# Patient Record
Sex: MALE | Race: WHITE | NOT HISPANIC OR LATINO | Employment: FULL TIME | URBAN - NONMETROPOLITAN AREA
[De-identification: names, ages, dates, MRNs, and addresses within clinical notes are randomized per-mention and may not be internally consistent; named-entity substitution may affect disease eponyms.]

---

## 2023-04-13 ENCOUNTER — HOSPITAL ENCOUNTER (OUTPATIENT)
Dept: DATA CONVERSION | Facility: HOSPITAL | Age: 58
End: 2023-04-13
Attending: SURGERY | Admitting: SURGERY
Payer: COMMERCIAL

## 2023-04-13 DIAGNOSIS — E11.9 TYPE 2 DIABETES MELLITUS WITHOUT COMPLICATIONS (MULTI): ICD-10-CM

## 2023-04-13 DIAGNOSIS — K63.5 POLYP OF COLON: ICD-10-CM

## 2023-04-13 DIAGNOSIS — Z79.84 LONG TERM (CURRENT) USE OF ORAL HYPOGLYCEMIC DRUGS: ICD-10-CM

## 2023-04-13 DIAGNOSIS — K62.1 RECTAL POLYP: ICD-10-CM

## 2023-04-13 DIAGNOSIS — Z12.11 ENCOUNTER FOR SCREENING FOR MALIGNANT NEOPLASM OF COLON: ICD-10-CM

## 2023-04-13 DIAGNOSIS — Z87.891 PERSONAL HISTORY OF NICOTINE DEPENDENCE: ICD-10-CM

## 2023-04-13 DIAGNOSIS — I10 ESSENTIAL (PRIMARY) HYPERTENSION: ICD-10-CM

## 2023-04-13 DIAGNOSIS — G25.81 RESTLESS LEGS SYNDROME: ICD-10-CM

## 2023-04-13 DIAGNOSIS — Z86.010 PERSONAL HISTORY OF COLONIC POLYPS: ICD-10-CM

## 2023-04-13 DIAGNOSIS — K57.30 DIVERTICULOSIS OF LARGE INTESTINE WITHOUT PERFORATION OR ABSCESS WITHOUT BLEEDING: ICD-10-CM

## 2023-04-13 DIAGNOSIS — E78.5 HYPERLIPIDEMIA, UNSPECIFIED: ICD-10-CM

## 2023-04-13 LAB — POCT GLUCOSE: 112 MG/DL (ref 74–99)

## 2023-04-28 LAB
COMPLETE PATHOLOGY REPORT: NORMAL
CONVERTED CLINICAL DIAGNOSIS-HISTORY: NORMAL
CONVERTED FINAL DIAGNOSIS: NORMAL
CONVERTED FINAL REPORT PDF LINK TO COPY AND PASTE: NORMAL
CONVERTED GROSS DESCRIPTION: NORMAL

## 2023-05-24 DIAGNOSIS — N18.1 TYPE 2 DIABETES MELLITUS WITH STAGE 1 CHRONIC KIDNEY DISEASE, WITHOUT LONG-TERM CURRENT USE OF INSULIN (MULTI): ICD-10-CM

## 2023-05-24 DIAGNOSIS — I10 PRIMARY HYPERTENSION: ICD-10-CM

## 2023-05-24 DIAGNOSIS — E11.22 TYPE 2 DIABETES MELLITUS WITH STAGE 1 CHRONIC KIDNEY DISEASE, WITHOUT LONG-TERM CURRENT USE OF INSULIN (MULTI): ICD-10-CM

## 2023-05-24 DIAGNOSIS — G25.81 RLS (RESTLESS LEGS SYNDROME): ICD-10-CM

## 2023-05-24 RX ORDER — CLOTRIMAZOLE AND BETAMETHASONE DIPROPIONATE 10; .64 MG/G; MG/G
1 CREAM TOPICAL 2 TIMES DAILY
COMMUNITY
Start: 2020-09-10

## 2023-05-24 RX ORDER — ROPINIROLE 1 MG/1
1 TABLET, FILM COATED ORAL 3 TIMES DAILY
Qty: 270 TABLET | Refills: 0 | Status: SHIPPED | OUTPATIENT
Start: 2023-05-24 | End: 2023-10-27 | Stop reason: SDUPTHER

## 2023-05-24 RX ORDER — MULTIVIT-MIN/FA/LYCOPEN/LUTEIN .4-300-25
1 TABLET ORAL DAILY
COMMUNITY
Start: 2020-08-26

## 2023-05-24 RX ORDER — SITAGLIPTIN 100 MG/1
100 TABLET, FILM COATED ORAL DAILY
COMMUNITY
End: 2023-05-24 | Stop reason: SDUPTHER

## 2023-05-24 RX ORDER — ROPINIROLE 1 MG/1
1 TABLET, FILM COATED ORAL 3 TIMES DAILY
COMMUNITY
End: 2023-05-24 | Stop reason: SDUPTHER

## 2023-05-24 RX ORDER — LISINOPRIL 5 MG/1
5 TABLET ORAL DAILY
COMMUNITY
End: 2023-05-24 | Stop reason: SDUPTHER

## 2023-05-24 RX ORDER — LISINOPRIL 5 MG/1
5 TABLET ORAL DAILY
Qty: 90 TABLET | Refills: 0 | Status: SHIPPED | OUTPATIENT
Start: 2023-05-24 | End: 2023-10-27 | Stop reason: SDUPTHER

## 2023-05-24 RX ORDER — METFORMIN HYDROCHLORIDE 500 MG/1
500 TABLET ORAL
Qty: 90 TABLET | Refills: 0 | Status: SHIPPED | OUTPATIENT
Start: 2023-05-24 | End: 2023-10-27 | Stop reason: SDUPTHER

## 2023-05-24 RX ORDER — CALCIUM CARBONATE 500(1250)
1 TABLET,CHEWABLE ORAL
COMMUNITY
Start: 2020-08-26 | End: 2023-10-27 | Stop reason: SDUPTHER

## 2023-05-24 RX ORDER — VIT C/E/ZN/COPPR/LUTEIN/ZEAXAN 250MG-90MG
25 CAPSULE ORAL DAILY
COMMUNITY
Start: 2020-08-26 | End: 2023-10-27 | Stop reason: SDUPTHER

## 2023-05-24 RX ORDER — METFORMIN HYDROCHLORIDE 500 MG/1
500 TABLET ORAL
COMMUNITY
End: 2023-05-24 | Stop reason: SDUPTHER

## 2023-09-07 VITALS
TEMPERATURE: 98.1 F | RESPIRATION RATE: 17 BRPM | HEART RATE: 64 BPM | DIASTOLIC BLOOD PRESSURE: 89 MMHG | BODY MASS INDEX: 27.59 KG/M2 | HEIGHT: 74 IN | SYSTOLIC BLOOD PRESSURE: 140 MMHG | WEIGHT: 214.95 LBS

## 2023-09-14 NOTE — H&P
History of Present Illness:   History Present Illness:  Reason for surgery: Low risk colon cancer   HPI:    Here for colonoscopy     Past Medical History/Past Surgical History - see Patient Info Tab/Significant Events     Allergies:        Allergies:  ·  No Known Allergies :     Home Medication Review:   Home Medications Reviewed: yes     Impression/Procedure:   ·  Impression and Planned Procedure: Low risk colon cancer   Colonoscopy       ERAS (Enhanced Recovery After Surgery):  ·  ERAS Patient: no     Review of Systems:   Review of Systems:  Constitutional: NEGATIVE: Fever, Chills, Anorexia,  Weight Loss, Malaise     All Other Systems: All other systems reviewed and  are negative       Vital Signs:  Temperature C: 36.7 degrees C   Temperature F: 98 degrees F   Heart Rate: 64 beats per minute   Respiratory Rate: 17 breath per minute   Blood Pressure Systolic: 140 mm/Hg   Blood Pressure Diastolic: 89 mm/Hg     Physical Exam by System:    Constitutional: Well developed , awake, alert   Eyes: Clear sclera   ENMT: mucous membranes moist, no apparent injury,  no lesions seen   Head/Neck: Neck supple, no masses   Respiratory/Thorax: Lungs clear to auscultation   Cardiovascular: Regular, rate and rhythm, no murmurs,   Gastrointestinal: Nondistended, soft, non-tender,  no masses   Musculoskeletal: ROM intact, no joint swelling, normal  strength   Extremities: normal extremities,   Neurological: Intact   Lymphatic: No significant lymphadenopathy   Psychological: Appropriate mood and behavior   Skin: Warm and dry, no lesions, no rashes     Consent:   COVID-19 Consent:  ·  COVID-19 Risk Consent Surgeon has reviewed key risks related to the risk of [place]ghan COVID-19 and if they contract COVID-19 what the risks are.       Electronic Signatures:  Martinez Mccarty)  (Signed 13-Apr-2023 07:11)   Authored: History of Present Illness, Allergies, Home  Medication Review, Impression/Procedure, ERAS, Review of Systems, Physical  Exam, Consent, Note Completion      Last Updated: 13-Apr-2023 07:11 by Martinez Mccarty)

## 2023-09-27 PROBLEM — F17.210 CIGARETTE NICOTINE DEPENDENCE WITHOUT COMPLICATION: Status: ACTIVE | Noted: 2023-09-27

## 2023-09-27 PROBLEM — I10 HTN (HYPERTENSION): Status: ACTIVE | Noted: 2023-09-27

## 2023-09-27 PROBLEM — R41.3 MEMORY PROBLEM: Status: ACTIVE | Noted: 2023-09-27

## 2023-09-27 PROBLEM — R53.83 FATIGUE: Status: ACTIVE | Noted: 2023-09-27

## 2023-09-27 PROBLEM — E78.5 DYSLIPIDEMIA: Status: ACTIVE | Noted: 2023-09-27

## 2023-09-27 PROBLEM — M53.9 MULTILEVEL DEGENERATIVE DISC DISEASE: Status: ACTIVE | Noted: 2023-09-27

## 2023-09-27 PROBLEM — R74.8 ABNORMAL LIVER ENZYMES: Status: ACTIVE | Noted: 2023-09-27

## 2023-09-27 PROBLEM — E66.3 OVER WEIGHT: Status: ACTIVE | Noted: 2023-09-27

## 2023-09-27 PROBLEM — E11.9 DM2 (DIABETES MELLITUS, TYPE 2) (MULTI): Status: ACTIVE | Noted: 2023-09-27

## 2023-09-27 PROBLEM — G25.81 RLS (RESTLESS LEGS SYNDROME): Status: ACTIVE | Noted: 2023-09-27

## 2023-09-27 PROBLEM — M72.0 DUPUYTREN'S CONTRACTURE OF LEFT HAND: Status: ACTIVE | Noted: 2023-09-27

## 2023-09-27 PROBLEM — E55.9 VITAMIN D DEFICIENCY: Status: ACTIVE | Noted: 2023-09-27

## 2023-09-27 RX ORDER — FLAXSEED OIL 1000 MG
CAPSULE ORAL DAILY
COMMUNITY
Start: 2020-08-26

## 2023-09-27 RX ORDER — GLUCOSAMINE/CHONDRO SU A 500-400 MG
TABLET ORAL DAILY
COMMUNITY
Start: 2020-08-26

## 2023-09-27 RX ORDER — PLANT STANOL ESTER 450 MG
TABLET ORAL
COMMUNITY
Start: 2020-08-26

## 2023-10-02 ENCOUNTER — OFFICE VISIT (OUTPATIENT)
Dept: PULMONOLOGY | Facility: CLINIC | Age: 58
End: 2023-10-02
Payer: COMMERCIAL

## 2023-10-02 VITALS
HEART RATE: 54 BPM | WEIGHT: 210 LBS | SYSTOLIC BLOOD PRESSURE: 174 MMHG | DIASTOLIC BLOOD PRESSURE: 81 MMHG | BODY MASS INDEX: 26.98 KG/M2 | OXYGEN SATURATION: 98 %

## 2023-10-02 DIAGNOSIS — F17.200 TOBACCO DEPENDENCE: Primary | ICD-10-CM

## 2023-10-02 DIAGNOSIS — G47.30 SLEEP DISORDER BREATHING: ICD-10-CM

## 2023-10-02 DIAGNOSIS — R06.02 SHORT OF BREATH ON EXERTION: ICD-10-CM

## 2023-10-02 PROCEDURE — 3044F HG A1C LEVEL LT 7.0%: CPT | Performed by: PEDIATRICS

## 2023-10-02 PROCEDURE — 4010F ACE/ARB THERAPY RXD/TAKEN: CPT | Performed by: PEDIATRICS

## 2023-10-02 PROCEDURE — 3077F SYST BP >= 140 MM HG: CPT | Performed by: PEDIATRICS

## 2023-10-02 PROCEDURE — 99205 OFFICE O/P NEW HI 60 MIN: CPT | Performed by: PEDIATRICS

## 2023-10-02 PROCEDURE — 3079F DIAST BP 80-89 MM HG: CPT | Performed by: PEDIATRICS

## 2023-10-02 ASSESSMENT — ENCOUNTER SYMPTOMS
FATIGUE: 1
SHORTNESS OF BREATH: 1
EYES NEGATIVE: 1
COUGH: 1
ARTHRALGIAS: 1

## 2023-10-02 NOTE — PROGRESS NOTES
Subjective   Patient ID: Randell Collier is a 58 y.o. male who presents for Cough.  Mr Collier is a 58yr old that is here for evaluation of poor sleep, intermittent cough and shortness of breath with exertion.  The cough is intermittent and occasionally productive, it is not terribly bothersome for him.  He does get short of breath with exertion.  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  He also has poor sleep and feels fatigued during the day.  He snores loudly as well.  He has been told he has sleep apnea in the past but never did anything about it.    He is a former smoker 1/2 - 1 ppd from age [age]        Review of Systems   Constitutional:  Positive for fatigue.   HENT: Negative.     Eyes: Negative.    Respiratory:  Positive for cough and shortness of breath.    Musculoskeletal:  Positive for arthralgias.       Objective   Physical Exam  Constitutional:       Appearance: Normal appearance.   HENT:      Head: Normocephalic and atraumatic.      Mouth/Throat:      Pharynx: Oropharynx is clear.   Cardiovascular:      Rate and Rhythm: Normal rate and regular rhythm.      Pulses: Normal pulses.      Heart sounds: Normal heart sounds.   Pulmonary:      Effort: Pulmonary effort is normal.      Breath sounds: Normal breath sounds. No wheezing, rhonchi or rales.   Abdominal:      General: Bowel sounds are normal.      Palpations: Abdomen is soft.   Musculoskeletal:         General: Normal range of motion.   Skin:     General: Skin is warm and dry.   Neurological:      General: No focal deficit present.      Mental Status: He is alert and oriented to person, place, and time.   Psychiatric:         Mood and Affect: Mood normal.         Assessment/Plan     58yr old with likely sleep disordered breathing, intermittent cough and JONES.    ?SDB:  - will get home sleep study  - follow up with sleep if needed    2. JONES:  ?COPD  - will get PFT and 6MWT    3. Tobacco use:  quit last  year  - will enroll in low dose [place] screening program.    Follow up after tests.

## 2023-10-17 ENCOUNTER — HOSPITAL ENCOUNTER (OUTPATIENT)
Dept: RADIOLOGY | Facility: HOSPITAL | Age: 58
Discharge: HOME | End: 2023-10-17
Payer: COMMERCIAL

## 2023-10-17 DIAGNOSIS — F17.200 TOBACCO DEPENDENCE: ICD-10-CM

## 2023-10-17 PROCEDURE — 71271 CT THORAX LUNG CANCER SCR C-: CPT | Performed by: RADIOLOGY

## 2023-10-17 PROCEDURE — 71271 CT THORAX LUNG CANCER SCR C-: CPT

## 2023-10-19 ENCOUNTER — HOSPITAL ENCOUNTER (OUTPATIENT)
Dept: RESPIRATORY THERAPY | Facility: HOSPITAL | Age: 58
Discharge: HOME | End: 2023-10-19
Payer: COMMERCIAL

## 2023-10-19 DIAGNOSIS — R06.02 SHORT OF BREATH ON EXERTION: ICD-10-CM

## 2023-10-19 DIAGNOSIS — R06.02 SHORTNESS OF BREATH: Primary | ICD-10-CM

## 2023-10-19 PROCEDURE — 94618 PULMONARY STRESS TESTING: CPT | Performed by: PEDIATRICS

## 2023-10-19 PROCEDURE — 94729 DIFFUSING CAPACITY: CPT | Performed by: PEDIATRICS

## 2023-10-19 PROCEDURE — 94060 EVALUATION OF WHEEZING: CPT | Performed by: PEDIATRICS

## 2023-10-19 PROCEDURE — 94726 PLETHYSMOGRAPHY LUNG VOLUMES: CPT | Performed by: PEDIATRICS

## 2023-10-27 ENCOUNTER — OFFICE VISIT (OUTPATIENT)
Dept: PRIMARY CARE | Facility: CLINIC | Age: 58
End: 2023-10-27
Payer: COMMERCIAL

## 2023-10-27 VITALS
SYSTOLIC BLOOD PRESSURE: 130 MMHG | WEIGHT: 231 LBS | HEIGHT: 74 IN | TEMPERATURE: 98 F | HEART RATE: 142 BPM | BODY MASS INDEX: 29.65 KG/M2 | OXYGEN SATURATION: 96 % | DIASTOLIC BLOOD PRESSURE: 80 MMHG

## 2023-10-27 DIAGNOSIS — E55.9 VITAMIN D DEFICIENCY: Primary | ICD-10-CM

## 2023-10-27 DIAGNOSIS — E11.9 TYPE 2 DIABETES MELLITUS WITHOUT COMPLICATION, WITHOUT LONG-TERM CURRENT USE OF INSULIN (MULTI): ICD-10-CM

## 2023-10-27 DIAGNOSIS — G25.81 RLS (RESTLESS LEGS SYNDROME): ICD-10-CM

## 2023-10-27 DIAGNOSIS — N18.1 TYPE 2 DIABETES MELLITUS WITH STAGE 1 CHRONIC KIDNEY DISEASE, WITHOUT LONG-TERM CURRENT USE OF INSULIN (MULTI): ICD-10-CM

## 2023-10-27 DIAGNOSIS — E11.22 TYPE 2 DIABETES MELLITUS WITH STAGE 1 CHRONIC KIDNEY DISEASE, WITHOUT LONG-TERM CURRENT USE OF INSULIN (MULTI): ICD-10-CM

## 2023-10-27 DIAGNOSIS — I10 PRIMARY HYPERTENSION: ICD-10-CM

## 2023-10-27 LAB — POC HEMOGLOBIN A1C: 5.2 % (ref 4.2–6.5)

## 2023-10-27 PROCEDURE — 4010F ACE/ARB THERAPY RXD/TAKEN: CPT

## 2023-10-27 PROCEDURE — 83036 HEMOGLOBIN GLYCOSYLATED A1C: CPT

## 2023-10-27 PROCEDURE — 99214 OFFICE O/P EST MOD 30 MIN: CPT

## 2023-10-27 PROCEDURE — 3079F DIAST BP 80-89 MM HG: CPT

## 2023-10-27 PROCEDURE — 3075F SYST BP GE 130 - 139MM HG: CPT

## 2023-10-27 PROCEDURE — 3044F HG A1C LEVEL LT 7.0%: CPT

## 2023-10-27 RX ORDER — VIT C/E/ZN/COPPR/LUTEIN/ZEAXAN 250MG-90MG
25 CAPSULE ORAL DAILY
Qty: 90 CAPSULE | Refills: 3 | Status: SHIPPED | OUTPATIENT
Start: 2023-10-27 | End: 2024-10-26

## 2023-10-27 RX ORDER — METFORMIN HYDROCHLORIDE 500 MG/1
500 TABLET ORAL
Qty: 90 TABLET | Refills: 3 | Status: SHIPPED | OUTPATIENT
Start: 2023-10-27

## 2023-10-27 RX ORDER — LISINOPRIL 5 MG/1
5 TABLET ORAL DAILY
Qty: 90 TABLET | Refills: 3 | Status: SHIPPED | OUTPATIENT
Start: 2023-10-27

## 2023-10-27 RX ORDER — ROPINIROLE 1 MG/1
1 TABLET, FILM COATED ORAL 3 TIMES DAILY
Qty: 270 TABLET | Refills: 3 | Status: SHIPPED | OUTPATIENT
Start: 2023-10-27

## 2023-10-27 RX ORDER — CALCIUM CARBONATE 500(1250)
1 TABLET,CHEWABLE ORAL
Qty: 90 TABLET | Refills: 3 | Status: SHIPPED | OUTPATIENT
Start: 2023-10-27 | End: 2024-10-26

## 2023-10-27 ASSESSMENT — ENCOUNTER SYMPTOMS
CARDIOVASCULAR NEGATIVE: 1
DEPRESSION: 0
LOSS OF SENSATION IN FEET: 0
RESPIRATORY NEGATIVE: 1
HEADACHES: 0
ENDOCRINE NEGATIVE: 1
DIZZINESS: 0
FEVER: 0
UNEXPECTED WEIGHT CHANGE: 0
SHORTNESS OF BREATH: 0
OCCASIONAL FEELINGS OF UNSTEADINESS: 0
FATIGUE: 0
ALLERGIC/IMMUNOLOGIC NEGATIVE: 1
MUSCULOSKELETAL NEGATIVE: 1
ABDOMINAL PAIN: 0
PSYCHIATRIC NEGATIVE: 1
GASTROINTESTINAL NEGATIVE: 1
ACTIVITY CHANGE: 0
WEAKNESS: 0

## 2023-10-27 ASSESSMENT — PATIENT HEALTH QUESTIONNAIRE - PHQ9
2. FEELING DOWN, DEPRESSED OR HOPELESS: NOT AT ALL
SUM OF ALL RESPONSES TO PHQ9 QUESTIONS 1 & 2: 0
1. LITTLE INTEREST OR PLEASURE IN DOING THINGS: NOT AT ALL

## 2023-10-27 NOTE — PROGRESS NOTES
Subjective   Patient ID: Randell Collier is a 58 y.o. male who presents for Establish Care (Randell is here to establish care. ).  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   Happy with his lifestyle, active  Appears younger than stated age  Fatigue Not of concern to him, he works long hours, up at 0530.         Vitals:    10/27/23 1321   BP: 130/80   Pulse: (!) 142   Temp: 36.7 °C (98 °F)   SpO2: 96%       Review of Systems   Constitutional:  Negative for activity change, fatigue, fever and unexpected weight change.   HENT: Negative.     Respiratory: Negative.  Negative for shortness of breath.    Cardiovascular: Negative.  Negative for chest pain.   Gastrointestinal: Negative.  Negative for abdominal pain.   Endocrine: Negative.    Musculoskeletal: Negative.    Skin: Negative.    Allergic/Immunologic: Negative.    Neurological:  Negative for dizziness, weakness and headaches.   Psychiatric/Behavioral: Negative.         Objective   Physical Exam  Vitals and nursing note reviewed.   Constitutional:       Appearance: Normal appearance.   HENT:      Head: Normocephalic.      Mouth/Throat:      Mouth: Mucous membranes are moist.   Cardiovascular:      Rate and Rhythm: Normal rate and regular rhythm.      Pulses: Normal pulses.      Heart sounds: Normal heart sounds. No murmur heard.     No friction rub. No gallop.   Pulmonary:      Effort: Pulmonary effort is normal. No respiratory distress.      Breath sounds: Normal breath sounds. No wheezing.   Abdominal:      General: Bowel sounds are normal. There is no distension.      Palpations: Abdomen is soft.      Tenderness: There is no abdominal tenderness.   Musculoskeletal:         General: No deformity. Normal range of motion.   Skin:     General: Skin is warm and dry.      Capillary Refill: Capillary refill takes less than 2 seconds.   Neurological:      General: No focal deficit present.       Mental Status: He is alert and oriented to person, place, and time.   Psychiatric:         Mood and Affect: Mood normal.         Assessment/Plan   Problem List Items Addressed This Visit       DM2 (diabetes mellitus, type 2) (CMS/[facility])    Relevant Medications    metFORMIN (Glucophage) 500 mg tablet    SITagliptin phosphate (Januvia) 100 mg tablet    Other Relevant Orders    POCT glycosylated hemoglobin (Hb A1C) manually resulted (Completed)    HTN (hypertension)    Relevant Medications    lisinopril 5 mg tablet    RLS (restless legs syndrome)    Relevant Medications    rOPINIRole (Requip) 1 mg tablet    Vitamin D deficiency - Primary    Relevant Medications    calcium carbonate 500 mg calcium (1,250 mg) chewable tablet    cholecalciferol (Vitamin D-3) 25 MCG (1000 UT) capsule            Thank you for coming in today, please call my office if you have any concerns or questions.     Mariusz TORRES, CNP

## 2023-11-20 LAB
MGC ASCENT PFT - FEV1 - POST: 3.76
MGC ASCENT PFT - FEV1 - PRE: 3.77
MGC ASCENT PFT - FEV1 - PREDICTED: 3.9
MGC ASCENT PFT - FVC - POST: 4.62
MGC ASCENT PFT - FVC - PRE: 4.54
MGC ASCENT PFT - FVC - PREDICTED: 5.08

## 2023-12-04 ENCOUNTER — CLINICAL SUPPORT (OUTPATIENT)
Dept: SLEEP MEDICINE | Facility: HOSPITAL | Age: 58
End: 2023-12-04
Payer: COMMERCIAL

## 2023-12-04 DIAGNOSIS — G47.30 SLEEP DISORDER BREATHING: ICD-10-CM

## 2023-12-04 PROCEDURE — 95806 SLEEP STUDY UNATT&RESP EFFT: CPT | Performed by: PSYCHIATRY & NEUROLOGY

## 2024-01-08 ENCOUNTER — APPOINTMENT (OUTPATIENT)
Dept: PULMONOLOGY | Facility: CLINIC | Age: 59
End: 2024-01-08
Payer: COMMERCIAL

## 2024-01-08 NOTE — PROGRESS NOTES
Subjective   Patient ID: Randell Collier is a 58 y.o. male who presents for cough, JONES, MADELEINE    HPI     Randell Collier is a 58 y.o. male who presents for Cough.  Mr Collier is a 58yr old that is here for evaluation of poor sleep, intermittent cough and shortness of breath with exertion.  The cough is intermittent and occasionally productive, it is not terribly bothersome for him.  He does get short of breath with exertion.  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  He also has poor sleep and feels fatigued during the day.  He snores loudly as well.  He has been told he has sleep apnea in the past but never did anything about it.     He is a former smoker 1/2 - 1 ppd from age [age]    Review of Systems    See scanned documents attached to this note for review of systems, and appropriate scales/scores for this visit.     Objective   Physical Exam    Assessment/Plan     58yr old with likely sleep disordered breathing, intermittent cough and JONES.     ?SDB:  - will get home sleep study  - follow up with sleep if needed     2. JONES:  ?COPD  - will get PFT and 6MWT     3. Tobacco use:  quit last year  - will enroll in low dose [place] screening program.     Follow up after tests.       Jersey Hernandez MD 01/08/24 9:06 AM

## 2024-01-15 ENCOUNTER — OFFICE VISIT (OUTPATIENT)
Dept: SLEEP MEDICINE | Facility: CLINIC | Age: 59
End: 2024-01-15
Payer: COMMERCIAL

## 2024-01-15 ENCOUNTER — OFFICE VISIT (OUTPATIENT)
Dept: PULMONOLOGY | Facility: CLINIC | Age: 59
End: 2024-01-15
Payer: COMMERCIAL

## 2024-01-15 VITALS
HEART RATE: 64 BPM | SYSTOLIC BLOOD PRESSURE: 167 MMHG | DIASTOLIC BLOOD PRESSURE: 74 MMHG | BODY MASS INDEX: 30.92 KG/M2 | WEIGHT: 237.6 LBS | OXYGEN SATURATION: 97 %

## 2024-01-15 VITALS
SYSTOLIC BLOOD PRESSURE: 167 MMHG | HEART RATE: 64 BPM | BODY MASS INDEX: 30.92 KG/M2 | OXYGEN SATURATION: 97 % | WEIGHT: 237.6 LBS | DIASTOLIC BLOOD PRESSURE: 74 MMHG

## 2024-01-15 DIAGNOSIS — G47.33 OSA (OBSTRUCTIVE SLEEP APNEA): Primary | ICD-10-CM

## 2024-01-15 DIAGNOSIS — R53.83 FATIGUE, UNSPECIFIED TYPE: ICD-10-CM

## 2024-01-15 DIAGNOSIS — R06.02 SHORTNESS OF BREATH: ICD-10-CM

## 2024-01-15 DIAGNOSIS — F17.210 CIGARETTE NICOTINE DEPENDENCE WITHOUT COMPLICATION: Primary | ICD-10-CM

## 2024-01-15 PROCEDURE — 1036F TOBACCO NON-USER: CPT | Performed by: PEDIATRICS

## 2024-01-15 PROCEDURE — 3078F DIAST BP <80 MM HG: CPT

## 2024-01-15 PROCEDURE — 3078F DIAST BP <80 MM HG: CPT | Performed by: PEDIATRICS

## 2024-01-15 PROCEDURE — 4010F ACE/ARB THERAPY RXD/TAKEN: CPT

## 2024-01-15 PROCEDURE — 3077F SYST BP >= 140 MM HG: CPT

## 2024-01-15 PROCEDURE — 4010F ACE/ARB THERAPY RXD/TAKEN: CPT | Performed by: PEDIATRICS

## 2024-01-15 PROCEDURE — 1036F TOBACCO NON-USER: CPT

## 2024-01-15 PROCEDURE — 99214 OFFICE O/P EST MOD 30 MIN: CPT

## 2024-01-15 PROCEDURE — 3077F SYST BP >= 140 MM HG: CPT | Performed by: PEDIATRICS

## 2024-01-15 PROCEDURE — 99215 OFFICE O/P EST HI 40 MIN: CPT | Performed by: PEDIATRICS

## 2024-01-15 ASSESSMENT — ANXIETY QUESTIONNAIRES
5. BEING SO RESTLESS THAT IT IS HARD TO SIT STILL: NOT AT ALL
3. WORRYING TOO MUCH ABOUT DIFFERENT THINGS: SEVERAL DAYS
7. FEELING AFRAID AS IF SOMETHING AWFUL MIGHT HAPPEN: NOT AT ALL
1. FEELING NERVOUS, ANXIOUS, OR ON EDGE: NOT AT ALL
2. NOT BEING ABLE TO STOP OR CONTROL WORRYING: SEVERAL DAYS
4. TROUBLE RELAXING: NOT AT ALL
6. BECOMING EASILY ANNOYED OR IRRITABLE: SEVERAL DAYS
GAD7 TOTAL SCORE: 3

## 2024-01-15 ASSESSMENT — SLEEP AND FATIGUE QUESTIONNAIRES
SITING INACTIVE IN A PUBLIC PLACE LIKE A CLASS ROOM OR A MOVIE THEATER: SLIGHT CHANCE OF DOZING
HOW LIKELY ARE YOU TO NOD OFF OR FALL ASLEEP WHILE SITTING AND TALKING TO SOMEONE: WOULD NEVER DOZE
SATISFACTION_WITH_CURRENT_SLEEP_PATTERN: SATISFIED
HOW LIKELY ARE YOU TO NOD OFF OR FALL ASLEEP WHEN YOU ARE A PASSENGER IN A CAR FOR AN HOUR WITHOUT A BREAK: WOULD NEVER DOZE
SLEEP_PROBLEM_INTERFERES_DAILY_ACTIVITIES: A LITTLE
DIFFICULTY_FALLING_ASLEEP: MILD
SLEEP_PROBLEM_NOTICEABLE_TO_OTHERS: A LITTLE
HOW LIKELY ARE YOU TO NOD OFF OR FALL ASLEEP WHILE SITTING AND READING: SLIGHT CHANCE OF DOZING
WORRIED_DISTRESSED_DUE_TO_SLEEP: A LITTLE
HOW LIKELY ARE YOU TO NOD OFF OR FALL ASLEEP WHILE SITTING QUIETLY AFTER LUNCH WITHOUT ALCOHOL: WOULD NEVER DOZE
HOW LIKELY ARE YOU TO NOD OFF OR FALL ASLEEP IN A CAR, WHILE STOPPED FOR A FEW MINUTES IN TRAFFIC: WOULD NEVER DOZE
DIFFICULTY_STAYING_ASLEEP: MILD
HOW LIKELY ARE YOU TO NOD OFF OR FALL ASLEEP WHILE WATCHING TV: MODERATE CHANCE OF DOZING
HOW LIKELY ARE YOU TO NOD OFF OR FALL ASLEEP WHILE LYING DOWN TO REST IN THE AFTERNOON WHEN CIRCUMSTANCES PERMIT: MODERATE CHANCE OF DOZING
ESS-CHAD TOTAL SCORE: 6

## 2024-01-15 ASSESSMENT — PATIENT HEALTH QUESTIONNAIRE - PHQ9
SUM OF ALL RESPONSES TO PHQ9 QUESTIONS 1 AND 2: 0
2. FEELING DOWN, DEPRESSED OR HOPELESS: NOT AT ALL
1. LITTLE INTEREST OR PLEASURE IN DOING THINGS: NOT AT ALL

## 2024-01-15 NOTE — LETTER
Sleep Apnea Information    What is sleep apnea? --- Sleep apnea is a condition that makes you stop breathing for short periods while you are asleep. There are 2 types of sleep apnea. One is called “obstructive sleep apnea” and the other is called “central sleep apnea”.     In obstructive sleep apnea, you stop breathing because your throat narrows or closes.    In central sleep apnea, you stop breathing because your brain does not send the right signals to your muscles to make you breathe.  When people talk about sleep apnea, they are usually referring to obstructive sleep apnea. People with sleep apnea do not know that they stop breathing when they are asleep. But they do sometimes wake up startled or gasping for breath. They also often hear from loved ones that they snore.    What are the symptoms of sleep apnea? --- The main symptoms of sleep apnea are loud snoring, tiredness, and daytime sleepiness. Other symptoms can include:  Restless sleep  Waking up choking or gasping in the middle of your sleep  Morning headaches, dry mouth, or sore throat  Waking up often to urinate  Waking up feeling unrested or groggy  Trouble thinking clearly or remembering things  Some people with sleep apnea don't have symptoms or they don't know they have them. They might figure that it's normal to be tired or to snore a lot.    Is there a test for sleep apnea? --- Yes. If your provider suspects you have sleep apneas, he or she might send you for a “sleep study”. Sleep studies can sometimes be done at home, but they are usually done in a sleep lab. For the sleep study done in a sleep lab, you have to spend the night and sleep in the lab while being hooked up to different machines that monitor your heart rate, breathing, and other body functions. The results of the test will tell your provider if you have the disorder.    Is there anything I can do on my own to help my sleep apnea? --- Yes. Here are some thing that might help:  Stay  off your back when sleeping (this is not always practical, because people cannot control their position while asleep. Plus, it only helped some people)  Lose weight, if you are overweight.  Avoid alcohol and sedative-hypnotic drugs because they can make sleep apnea wore.    How is sleep apnea treated? --- As mentioned above weight loss can help if you are overweight or obese. But losing weight can be challenging, and it takes time to lose enough weight to help with your sleep apnea. Most people need other treatment while they work on losing weight.    The most effective treatment for sleep apnea is a device that keeps your airway open while you sleep. Treatment with this device is called “continuous positive airway pressure,” or CPAP. People getting CPAP wear a face mask at night that keeps them breathing.  If you doctor or nurse recommends a CPAP machine, try to be patient about using it. The mask might seem uncomfortable to wear at first, and the machine might seem noisy, but using the machine can really pay off. People with sleep apnea who use a CPAP machine feel more rested and generally feel better.    There is also another device that you wear in your mouth called an “oral appliance” or mandibular advancement device.” It also helps keep your airway open while you sleep.    In rare cases, when nothing else help, doctors recommend surgery to keep the airway open. Surgery to do this is not always effective, and even when it is, the problem can come back. In other people, CPAP is still needed after surgery.    Lastly, in certain circumstances, doctors may recommend the hypoglossal nerve stimulator also known as Inspire. Inspire is a device that is being implanted under the skin below your right collage bone. It has its remote control that you can use to activate it at bedtime. It basically moves your tongue forward while you are sleeping to open up your airway however, not everybody is a good candidate for  Inspire; hence, you will need a comprehensive evaluation with testing such as sleep study and IDISE (drug-induced sleep endoscopy) from a sleep specialist before getting Inspire implanted.    Is sleep apnea dangerous? --- It can be. People with sleep apnea do not get good-quality sleep, so they are often tired and not alert. This puts them at risk for car accidents and other types of accidents. Plus, studies show that people with sleep apnea are more likely than others to have high blood pressure, heart attacks and other serious heart problems. In people with sever sleep apnea, getting treated (for example with a CPAP machine) can help prevent some of these problems.      HEALTHY SLEEP TIPS FOR PATIENTS WITH MADELEINE    Weight Reduction  An increase in your weight will make your snoring and sleep-related breathing irregularities worse, and reducing your weight is likely to improve your condition. It is important that you try to reach an ideal body weight and maintain it. Reducing your weight and being more physically active will also decrease the risk of developing cardiovascular disease.    Alcohol Intake  Alcohol is a potent sedative that can induce irregular breathing apneas in health people. Alcohol will not only increase the number of apneas, but will also make the apneas longer and reduce the oxygen level in the blood. It is very important that alcohol be avoided within 4-6 hours before bedtime.    Other Sedating Medications  Sedating medications include sleeping pills, some antihistamines, anti-allergy pills, some cold medications, some epilepsy drugs, opiates and narcotic-type pain medications, and other medications for psychiatric conditions. As with alcohol, sedative medications are likely to worsen your breathing irregularities. Definitely, do not use any sedating medications with alcohol and pain medications as the effects will be potentiated several times.    Sleep Deprivation  Try to maintain a regular  schedule with adequate amount of sleeping time. Any loss of sleep will increase your need for sleep the next night, and your breathing irregularities are likely to become more severe.     Sleep Position  Sleeping on your back is likely to increase breathing irregularities as your tongue tends to obstruct the back of your throat resulting in a decrease in the size of the airway. Some find that sewing a tennis ball into the back of a sleeping shirt help prevent back sleeping. You can also find premade positional therapy devices on the market (i.e. ProtoGeo bump) work just as well.     Bed Position  Elevate the head of the bed 4-6 inches to take some of the pressure off the diaphragm. This may ease breathing during sleep. You can also put wood blocks under the legs of the head of the bed so it will be on a slight incline.    Colds and Allergies  Any congestion affecting your nasal passages or throat will cause swelling and tend to worsen breathing irregularities. Consider using intranasal steroid spray or nasal saline spray with or without oral non-sedating anti-histamines.    Meals  Large meals should be avoided at least 2 hours before bedtime. A large meal may increase the pressure on the diaphragm and worsen breathing. It may also predispose you to regurgitation of the stomach contents (reflux) during sleep which may provoke irritation of the airway.    Smoking   Nicotine and other substances contained in cigarettes, when inhaled will provoke irritation of the upper airway and worsen breathing irregularities. Try to stop smoking altogether.

## 2024-01-15 NOTE — PATIENT INSTRUCTIONS
It was a pleasure meeting you today Randell Collier     As we discussed today in clinic:    1. We reviewed your sleep study, you have mild MADELEINE, I highly encourage you to treat your MADELEINE, however, you have decided at this time to not pursue PAP therapy.   2. Encouraged to lose weight.   3. Remember, don't drive when sleepy.   4. Stay off your back when sleeping.  5. Return to clinic in 6 months or sooner if weight gain or sleep symptoms worsen  6. Your BP was elevated today in clinic - monitor your BP, if continually elevated or your experience any lightheaded, dizziness, CP, headache or concerning symptoms notify your PCP or go to the nearest ER for evaluation      Education handouts given: Sleep Apnea Information  and Sleep Hygiene     Please follow-up in 1 year    ALWAYS BRING YOUR CPAP / BIPAP WITH YOU TO EVERY APPOINTMENT!  THANKS    FOR QUESTIONS AND CONCERNS:   1. In case of problems with machine or mask interface, please contact your DME company first. DME is the company that provides you the machine and/or CPAP supplies. If [employer] if your DME, you can reach them at [phone] or [employer]) [phone].  2. For SLEEP STUDY appointments, please call [phone] ([facility]) or [phone] / [phone] ([facility]) or any other  Sleep Lab location please call [phone]  3. For MEDICAL QUESTIONS, MEDICATION REFILLS, or CLINIC APPOINTMENT SCHEDULING, please call [phone] and my practice lead Cyndie would gladly assist you with any concerns.   4. In the event that you are running more than 10 minutes late to your appointment or 5 minutes to virtual, I will kindly ask you to reschedule.    Here at [facility], we wish you a restful sleep!

## 2024-01-15 NOTE — PROGRESS NOTES
Patient: Randell Collier  : 1965 AGE: 58 y.o. SEX:male   MRN: [id number]   Provider: TUSHAR Geronimo     Location [facility]   Service Date: 1/15/2024     PCP: TUSHAR Martins   Referred by: No ref. provider found            [facility]  NEW PATIENT VISIT NOTE      PATIENT INFORMATION     The patient's referring provider is: TUSHAR Martins  The patient's Primary Care Provider: TUSHAR Martins    HISTORY OF PRESENT ILLNESS     Patient ID: Randell Collier is a 58 y.o. male who presents to a [facility] for evaluation for evaluation for sleep apnea, review of sleep study results, and Snoring    Patient is here alone today.  The patient has pertinent hx of MADELEINE, sleep disturbance, snoring, EDS, fatigue, obesity, HTN, former smoker, allergic rhinitis, GERD, vitamin D deficiency, T2DM, RLS, and chronic pain.     Patient here today to review sleep study results. He reports that his wife told his PCP that he snores. She was concerned for sleep apnea. Patient had HSAT completed and referred here for further management. I discussed with the patient that his sleep study showed mild MADELEINE. Discussed the difference between 3% vs 4% scoring guidelines as well as the possibility of the HSAT underestimating the severity of MADELEINE. His SpO2 kush was 82.8% with 0.4 minutes less than 88%.   Patient reports that his sleep is not an issue. He only tested for his wife appeasement. We discussed treatment options including Inspire, OAT, positional therapy and PAP therapy. He does not currently qualify for Inspire d/t mild MADELEINE. He already sleep on his side mostly. Discussed PAP therapy in depth. Patient is not amendable to Pap therapy. I discussed that his weight could be contributing to some of his MADELEINE. He is going to work on his weight and come back in 6 months for a recheck. He is going to work with his  PCP to decrease his weight.   Patient BP was elevated in clinic, denied symptoms of elevated BP. Discussed this could potentially be caused from untreated MADELEINE, still declining treatment.   Former smoker.       SLEEP HISTORY     SLEEP STUDY HISTORY  HSAT - 2023  BMI -   TRT - 504.1 mins  TMT - 460.5 mins  AHI3% - 12.6/hr  AHI4% - 9.1/hr  Supine AHI - 12.6/hr  SpO2 kush - 82.8%   <88% = 0.4 mins      SLEEP-WAKE SCHEDULE  Bedtime:  930 pm daily  Subjective sleep latency: 10 ins  Difficulty falling asleep: No   Number of awakenings: x1 times per night spontaneously for unknown reasons  Falls back asleep in 10 mins or less  Difficulty staying asleep: No    Final wake time:  5 am daily  Out of bed time:  515 am daily  Shift work: denied  Naps: x3-4 per week for 1 - 2 hrs  Feels rested after a nap: Yes   Average sleep duration (excluding naps): 7 - 8 hrs    SLEEP ENVIRONMENT  Sleep location: bed  Sleep status: sleeps with wife  Preferred sleep position: side  TV in bedroom:   Room is dark:  Yes  Room is quiet: Yes  Room is cool: Yes  Bed comfort: good    SLEEP HABITS   Activities before bedtime: TV, relaxing  Activities in bed: denied  Clockwatching: No   Smoking: former  ETOH: denied  Marijuana: denied  Caffeine: daily  Sleep aids: denied    WEIGHT: stable    Claustrophobia: No     STOP-BAN  ESS: 6   YURY: 5  PHQ-2:  NEGATIVE SCREEN  VESTA-7: 3      REVIEW OF SYSTEMS     REVIEW OF SYSTEMS  Sleep-related ROS:  Night symptoms: POSITIVE for snoring and NEGATIVE for witnessed apnea, wake up gasping and/or choking for air, nasal congestion and/or post nasal drip, mouth breathing, night sweats during sleep, waking up with racing heart, heartburn or sour taste in mouth at night, nocturnal cough, nocturia, and erectile dysfunction  Morning symptoms: POSITIVE for refreshing sleep and NEGATIVE for morning headache, morning dry mouth, and morning sore throat  Daytime symptoms POSITIVE for trouble staying focused in daytime  and NEGATIVE for excessive daytime sleepiness, fatigue, trouble remembering things in daytime, irritability in daytime, and drowsy driving  Hypersomnia / narcolepsy symptoms: Patient denies symptoms of a hypersomnolence disorder such as sleep paralysis, sleep-related hallucinations, recurrent sleep attacks, automatic behaviors, and cataplexy.   Parasomnia symptoms: Patient denies symptoms of parasomnia.  Movements in sleep: Patient denies problematic movements in sleep,     RLS screen:  RLSSCREEN: - Sensations: Patient does not have unusual sensations in their extremities that cause an urge to move them   - Movement: Patient has not been told that their legs kick or jerk during sleep    Naps:   Yes. Naps ARE/ARENOT: are refreshing.    Review of Systems   Constitutional:  Negative for fatigue.   HENT:  Negative for congestion, postnasal drip and tinnitus.    Respiratory:  Positive for apnea. Negative for cough, chest tightness, shortness of breath and wheezing.    Cardiovascular:  Negative for chest pain, palpitations and leg swelling.   Gastrointestinal:  Negative for abdominal pain.   Genitourinary:  Negative for enuresis and frequency.   Musculoskeletal:  Negative for arthralgias, back pain, gait problem and myalgias.   Skin: Negative.    Allergic/Immunologic: Negative for environmental allergies.   Neurological:  Negative for dizziness, tremors, seizures, weakness, light-headedness and headaches.   Hematological:  Does not bruise/bleed easily.   Psychiatric/Behavioral:  Negative for confusion, decreased concentration, hallucinations, sleep disturbance and suicidal ideas. The patient is not nervous/anxious.    All other systems reviewed and are negative.    All other systems have been reviewed and are negative.      ALLERGIES AND MEDICATIONS     ALLERGIES  No Known Allergies    MEDICATIONS  Current Outpatient Medications   Medication Sig Dispense Refill    calcium carbonate 500 mg calcium (1,250 mg) chewable  tablet Chew 1 tablet (500 mg) once daily. 90 tablet 3    cholecalciferol (Vitamin D-3) 25 MCG (1000 UT) capsule Take 1 capsule (25 mcg) by mouth once daily. 90 capsule 3    clotrimazole-betamethasone (Lotrisone) cream Apply 1 Application topically twice a day.      fish oil concentrate (Omega-3) 120-180 mg capsule Take 1 capsule (1 g) by mouth once daily.      flaxseed oiL 1,000 mg capsule Take by mouth once daily.      glucosamine-chondroitin 500-400 mg tablet Take by mouth once daily.      lisinopril 5 mg tablet Take 1 tablet (5 mg) by mouth once daily. 90 tablet 3    metFORMIN (Glucophage) 500 mg tablet Take 1 tablet (500 mg) by mouth once daily in the morning. Take before meals. 90 tablet 3    multivit-iron-minerals-folic acid (Centrum Silver) 0.4 mg-300 mcg- 250 mcg tab Take 1 tablet by mouth once daily.      potassium gluconate 550 mg (90 mg) tablet Take by mouth.      rOPINIRole (Requip) 1 mg tablet Take 1 tablet (1 mg) by mouth 3 times a day. 270 tablet 3    SITagliptin phosphate (Januvia) 100 mg tablet Take 1 tablet (100 mg) by mouth once daily. 90 tablet 3     No current facility-administered medications for this visit.         PAST HISTORY     PERTINENT PAST MEDICAL HISTORY: See HPI    PERTINENT PAST SURGICAL HISTORY for Sleep Medicine:  non-contributory    PERTINENT FAMILY HISTORY for Sleep Medicine:  Patient denies family history of any sleep disorder.  Patient denies family history of sleep apnea.    PERTINENT SOCIAL HISTORY:  He  reports that he quit smoking about 14 months ago. His smoking use included cigarettes. He has never used smokeless tobacco. Alcohol use questions deferred to the physician. No history on file for drug use. He currently lives with spouse and employed full-time    Active Problems, Allergy List, Medication List, and PMH/PSH/FH/Social Hx have been reviewed and reconciled in chart. No significant changes unless documented in the pertinent chart section. Updates made when  "necessary.       PHYSICAL EXAM     VITAL SIGNS: /74   Pulse 64   Wt 108 kg (237 lb 9.6 oz)   SpO2 97%   BMI 30.92 kg/m²     NECK CIRCUMFERENCE:     CURRENT WEIGHT:   Vitals:    01/15/24 1433   Weight: 108 kg (237 lb 9.6 oz)      BMI: Body mass index is 30.92 kg/m².    PREVIOUS WEIGHTS:  Wt Readings from Last 3 Encounters:   01/15/24 108 kg (237 lb 9.6 oz)   01/15/24 108 kg (237 lb 9.6 oz)   10/27/23 105 kg (231 lb)       Physical Exam  Constitutional: Awake, not in distress  Lungs: Clear to auscultation bilateral, no cough noted  Heart: Regular rate and rhythm  Skin: Warm, no rash  Neuro: No tremors, moves all extremities  Psych: alert and oriented to time, place, and person    ENT: Modified Mallampati score - III            RESULTS/DATA     No results found for: \"IRON\", \"TRANSFERRIN\", \"IRONSAT\", \"TIBC\", \"FERRITIN\"    Bicarbonate   Date Value Ref Range Status   02/01/2023 27 21 - 32 mmol/L Final       PAP Adherence  Not applicable      ASSESSMENT/PLAN     Assessment/Plan   Randell Collier is a 58 y.o. male presents today in [facility] with the following problems:      OBSTRUCTIVE SLEEP APNEA, mild (HSAT AHI: 12.6/hr)  - Patient's risk factors for MADELEINE: age, gender, HTN, and narrow crowded upper airway anatomy  - Current symptoms are: see hpi  - MADELEINE diagnosed by HSAT in 12/2023. Reviewed sleep studies today in clinic. See HPI.  - I discussed multiple treatment options with patient including OAT, Inspire, positional therapy and PAP therapy  - I recommend starting auto-CPAP, he declined  - did not wish to proceed with any offered treatments options, I discussed the risk of untreated MADELEINE including but not limited to heart arhythmia, Afib, uncontrolled HTN, memory issues, heart attack stroke, drowsy driving with injury, injury related to tiredness - he verbalized understanding  - patient would like to proceed with weight loss management and follow-up in 6 months to be retested " after weight loss, he is going to reach out to PCP for management  - Discussed MADELEINE pathophysiology, diagnostic testing (HST vs PSG), cardiometabolic and neurocognitive sequelae of untreated MADELEINE, and treatment options (PAP therapy, oral appliance, surgery, hypoglossal nerve stimulator called INSPIRE, etc).    - I had face-to-face discussion with the patient about the need for PAP therapy to treat sleep apnea. Patient agreed with the plan and verbalized understanding.      OBESITY  - BMI today Body mass index is 30.92 kg/m².   - no significant weight changes noted or reported  - Encouraged patient to lose weight with diet and exercise.   - Weight loss can help in the long term treatment of MADELEINE.  - Defer management to PCP      HYPERTENSION  - BP today 167/74  - denies any headache, blurry vision, chest pain, palpitation, dizziness, lightheadedness, or syncopal episodes  - encouraged daily exercise with healthy diet for BP and MADELEINE management  - Defer management to PCP    DEPRESSION  / ANXIETY   + VESTA screen, NEGATIVE SCREEN PHQ-2  - denies any SI, HI or hallucinations   - never been on medications for anxiety or depression  - defer management to PCP     SMOKING STATUS screen  - former smoker        All of patient's questions were answered. He verbalizes understanding and agreement with my assessment and plan.

## 2024-01-15 NOTE — PROGRESS NOTES
Subjective   Patient ID: Randell Collier is a 58 y.o. male who presents for cough, JONES    HPI    Mr Amira is about the same.  He had a PFT done which is normal, he also had a low dose screening CT which shows a 2,, and 3mm nodule otherwise normal lungs.  He had a home sleep study that shows sleep apnea with hypoxia      Previous note 10/2/2023:  Mr Collier is a 58yr old that is here for evaluation of poor sleep, intermittent cough and shortness of breath with exertion.  The cough is intermittent and occasionally productive, it is not terribly bothersome for him.  He does get short of breath with exertion.  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  He also has poor sleep and feels fatigued during the day.  He snores loudly as well.  He has been told he has sleep apnea in the past but never did anything about it.     He is a former smoker 1/2 - 1 ppd from age [age]    Review of Systems    See scanned documents attached to this note for review of systems, and appropriate scales/scores for this visit.     Objective   Physical Exam  Constitutional:       Appearance: Normal appearance.   HENT:      Head: Normocephalic and atraumatic.      Mouth/Throat:      Pharynx: Oropharynx is clear.   Cardiovascular:      Rate and Rhythm: Normal rate and regular rhythm.      Pulses: Normal pulses.      Heart sounds: Normal heart sounds.   Pulmonary:      Effort: Pulmonary effort is normal.      Breath sounds: Normal breath sounds. No wheezing, rhonchi or rales.   Abdominal:      General: Bowel sounds are normal.      Palpations: Abdomen is soft.   Musculoskeletal:         General: Normal range of motion.   Skin:     General: Skin is warm and dry.   Neurological:      General: No focal deficit present.      Mental Status: He is alert and oriented to person, place, and time.   Psychiatric:         Mood and Affect: Mood normal.             Assessment/Plan     58yr old with likely sleep  disordered breathing, intermittent cough and JONES.     ?SDB: home study with sleep apnea and hypoxia  - will see Isabell Bustamante today     2. JONES:  normal PFT, only occurs when rushing around  - normal PFT, minimal symptoms  - nothing further to do  - if symptoms worsen could consider CPEX     3. Tobacco use:  quit last year initial LDCT with 2 tiny nodules  - continue yearly screening next due on/after 10/18/2024    Follow up after LDCT        Jersey Hernandez MD 01/15/24 11:14 AM

## 2024-01-15 NOTE — LETTER
2024     TUSHAR Martins  [address] [facility] [id number]    Patient: Randell Collier   YOB: 1965   Date of Visit: 1/15/2024       Dear TUSHAR Rosales:    Thank you for referring Randell Collier to me for evaluation. Below are my notes for this consultation.  If you have questions, please do not hesitate to call me. I look forward to following your patient along with you.       Sincerely,     TUSHAR Geronimo      CC: No Recipients  ______________________________________________________________________________________     Patient: Randell Collier  : 1965 AGE: 58 y.o. SEX:male   MRN: [id number]   Provider: TUSHAR Geronimo     Location [facility]   Service Date: 1/15/2024     PCP: TUSHAR Martins   Referred by: No ref. provider found            [facility]  NEW PATIENT VISIT NOTE      PATIENT INFORMATION     The patient's referring provider is: TUSHAR Martins  The patient's Primary Care Provider: TUSHAR Martins    HISTORY OF PRESENT ILLNESS     Patient ID: Randell Collier is a 58 y.o. male who presents to a [facility] for evaluation for evaluation for sleep apnea, review of sleep study results, and Snoring    Patient is here alone today.  The patient has pertinent hx of MADELEINE, sleep disturbance, snoring, EDS, fatigue, obesity, HTN, former smoker, allergic rhinitis, GERD, vitamin D deficiency, T2DM, RLS, and chronic pain.     Patient here today to review sleep study results. He reports that his wife told his PCP that he snores. She was concerned for sleep apnea. Patient had HSAT completed and referred here for further management. I discussed with the patient that his sleep study showed mild MADELEINE. Discussed the difference between 3% vs 4% scoring guidelines as well as the possibility of the HSAT underestimating the  severity of MADELEINE. His SpO2 kush was 82.8% with 0.4 minutes less than 88%.   Patient reports that his sleep is not an issue. He only tested for his wife appeasement. We discussed treatment options including Inspire, OAT, positional therapy and PAP therapy. He does not currently qualify for Inspire d/t mild MADELEINE. He already sleep on his side mostly. Discussed PAP therapy in depth. Patient is not amendable to Pap therapy. I discussed that his weight could be contributing to some of his MADELEINE. He is going to work on his weight and come back in 6 months for a recheck. He is going to work with his PCP to decrease his weight.   Patient BP was elevated in clinic, denied symptoms of elevated BP. Discussed this could potentially be caused from untreated MADELEINE, still declining treatment.   Former smoker.       SLEEP HISTORY     SLEEP STUDY HISTORY  HSAT - 2023  BMI -   TRT - 504.1 mins  TMT - 460.5 mins  AHI3% - 12.6/hr  AHI4% - 9.1/hr  Supine AHI - 12.6/hr  SpO2 kush - 82.8%   <88% = 0.4 mins      SLEEP-WAKE SCHEDULE  Bedtime:  930 pm daily  Subjective sleep latency: 10 ins  Difficulty falling asleep: No   Number of awakenings: x1 times per night spontaneously for unknown reasons  Falls back asleep in 10 mins or less  Difficulty staying asleep: No    Final wake time:  5 am daily  Out of bed time:  515 am daily  Shift work: denied  Naps: x3-4 per week for 1 - 2 hrs  Feels rested after a nap: Yes   Average sleep duration (excluding naps): 7 - 8 hrs    SLEEP ENVIRONMENT  Sleep location: bed  Sleep status: sleeps with wife  Preferred sleep position: side  TV in bedroom:   Room is dark:  Yes  Room is quiet: Yes  Room is cool: Yes  Bed comfort: good    SLEEP HABITS   Activities before bedtime: TV, relaxing  Activities in bed: denied  Clockwatching: No   Smoking: former  ETOH: denied  Marijuana: denied  Caffeine: daily  Sleep aids: denied    WEIGHT: stable    Claustrophobia: No     STOP-BAN  ESS: 6   YURY: 5  PHQ-2:  NEGATIVE  SCREEN  VESTA-7: 3      REVIEW OF SYSTEMS     REVIEW OF SYSTEMS  Sleep-related ROS:  Night symptoms: POSITIVE for snoring and NEGATIVE for witnessed apnea, wake up gasping and/or choking for air, nasal congestion and/or post nasal drip, mouth breathing, night sweats during sleep, waking up with racing heart, heartburn or sour taste in mouth at night, nocturnal cough, nocturia, and erectile dysfunction  Morning symptoms: POSITIVE for refreshing sleep and NEGATIVE for morning headache, morning dry mouth, and morning sore throat  Daytime symptoms POSITIVE for trouble staying focused in daytime and NEGATIVE for excessive daytime sleepiness, fatigue, trouble remembering things in daytime, irritability in daytime, and drowsy driving  Hypersomnia / narcolepsy symptoms: Patient denies symptoms of a hypersomnolence disorder such as sleep paralysis, sleep-related hallucinations, recurrent sleep attacks, automatic behaviors, and cataplexy.   Parasomnia symptoms: Patient denies symptoms of parasomnia.  Movements in sleep: Patient denies problematic movements in sleep,     RLS screen:  RLSSCREEN: - Sensations: Patient does not have unusual sensations in their extremities that cause an urge to move them   - Movement: Patient has not been told that their legs kick or jerk during sleep    Naps:   Yes. Naps ARE/ARENOT: are refreshing.    Review of Systems   Constitutional:  Negative for fatigue.   HENT:  Negative for congestion, postnasal drip and tinnitus.    Respiratory:  Positive for apnea. Negative for cough, chest tightness, shortness of breath and wheezing.    Cardiovascular:  Negative for chest pain, palpitations and leg swelling.   Gastrointestinal:  Negative for abdominal pain.   Genitourinary:  Negative for enuresis and frequency.   Musculoskeletal:  Negative for arthralgias, back pain, gait problem and myalgias.   Skin: Negative.    Allergic/Immunologic: Negative for environmental allergies.   Neurological:  Negative for  dizziness, tremors, seizures, weakness, light-headedness and headaches.   Hematological:  Does not bruise/bleed easily.   Psychiatric/Behavioral:  Negative for confusion, decreased concentration, hallucinations, sleep disturbance and suicidal ideas. The patient is not nervous/anxious.    All other systems reviewed and are negative.    All other systems have been reviewed and are negative.      ALLERGIES AND MEDICATIONS     ALLERGIES  No Known Allergies    MEDICATIONS  Current Outpatient Medications   Medication Sig Dispense Refill   • calcium carbonate 500 mg calcium (1,250 mg) chewable tablet Chew 1 tablet (500 mg) once daily. 90 tablet 3   • cholecalciferol (Vitamin D-3) 25 MCG (1000 UT) capsule Take 1 capsule (25 mcg) by mouth once daily. 90 capsule 3   • clotrimazole-betamethasone (Lotrisone) cream Apply 1 Application topically twice a day.     • fish oil concentrate (Omega-3) 120-180 mg capsule Take 1 capsule (1 g) by mouth once daily.     • flaxseed oiL 1,000 mg capsule Take by mouth once daily.     • glucosamine-chondroitin 500-400 mg tablet Take by mouth once daily.     • lisinopril 5 mg tablet Take 1 tablet (5 mg) by mouth once daily. 90 tablet 3   • metFORMIN (Glucophage) 500 mg tablet Take 1 tablet (500 mg) by mouth once daily in the morning. Take before meals. 90 tablet 3   • multivit-iron-minerals-folic acid (Centrum Silver) 0.4 mg-300 mcg- 250 mcg tab Take 1 tablet by mouth once daily.     • potassium gluconate 550 mg (90 mg) tablet Take by mouth.     • rOPINIRole (Requip) 1 mg tablet Take 1 tablet (1 mg) by mouth 3 times a day. 270 tablet 3   • SITagliptin phosphate (Januvia) 100 mg tablet Take 1 tablet (100 mg) by mouth once daily. 90 tablet 3     No current facility-administered medications for this visit.         PAST HISTORY     PERTINENT PAST MEDICAL HISTORY: See HPI    PERTINENT PAST SURGICAL HISTORY for Sleep Medicine:  non-contributory    PERTINENT FAMILY HISTORY for Sleep Medicine:  Patient  "denies family history of any sleep disorder.  Patient denies family history of sleep apnea.    PERTINENT SOCIAL HISTORY:  He  reports that he quit smoking about 14 months ago. His smoking use included cigarettes. He has never used smokeless tobacco. Alcohol use questions deferred to the physician. No history on file for drug use. He currently lives with spouse and employed full-time    Active Problems, Allergy List, Medication List, and PMH/PSH/FH/Social Hx have been reviewed and reconciled in chart. No significant changes unless documented in the pertinent chart section. Updates made when necessary.       PHYSICAL EXAM     VITAL SIGNS: /74   Pulse 64   Wt 108 kg (237 lb 9.6 oz)   SpO2 97%   BMI 30.92 kg/m²     NECK CIRCUMFERENCE:     CURRENT WEIGHT:   Vitals:    01/15/24 1433   Weight: 108 kg (237 lb 9.6 oz)      BMI: Body mass index is 30.92 kg/m².    PREVIOUS WEIGHTS:  Wt Readings from Last 3 Encounters:   01/15/24 108 kg (237 lb 9.6 oz)   01/15/24 108 kg (237 lb 9.6 oz)   10/27/23 105 kg (231 lb)       Physical Exam  Constitutional: Awake, not in distress  Lungs: Clear to auscultation bilateral, no cough noted  Heart: Regular rate and rhythm  Skin: Warm, no rash  Neuro: No tremors, moves all extremities  Psych: alert and oriented to time, place, and person    ENT: Modified Mallampati score - III            RESULTS/DATA     No results found for: \"IRON\", \"TRANSFERRIN\", \"IRONSAT\", \"TIBC\", \"FERRITIN\"    Bicarbonate   Date Value Ref Range Status   02/01/2023 27 21 - 32 mmol/L Final       PAP Adherence  Not applicable      ASSESSMENT/PLAN     Assessment/Plan   Randell Collier is a 58 y.o. male presents today in [facility] with the following problems:      OBSTRUCTIVE SLEEP APNEA, mild (HSAT AHI: 12.6/hr)  - Patient's risk factors for MADELEINE: age, gender, HTN, and narrow crowded upper airway anatomy  - Current symptoms are: see hpi  - MADELEINE diagnosed by HSAT in 12/2023. Reviewed " sleep studies today in clinic. See HPI.  - I discussed multiple treatment options with patient including OAT, Inspire, positional therapy and PAP therapy  - I recommend starting auto-CPAP, he declined  - did not wish to proceed with any offered treatments options, I discussed the risk of untreated MADELEINE including but not limited to heart arhythmia, Afib, uncontrolled HTN, memory issues, heart attack stroke, drowsy driving with injury, injury related to tiredness - he verbalized understanding  - patient would like to proceed with weight loss management and follow-up in 6 months to be retested after weight loss, he is going to reach out to PCP for management  - Discussed MADELEINE pathophysiology, diagnostic testing (HST vs PSG), cardiometabolic and neurocognitive sequelae of untreated MADELEINE, and treatment options (PAP therapy, oral appliance, surgery, hypoglossal nerve stimulator called INSPIRE, etc).    - I had face-to-face discussion with the patient about the need for PAP therapy to treat sleep apnea. Patient agreed with the plan and verbalized understanding.      OBESITY  - BMI today Body mass index is 30.92 kg/m².   - no significant weight changes noted or reported  - Encouraged patient to lose weight with diet and exercise.   - Weight loss can help in the long term treatment of MADELEINE.  - Defer management to PCP      HYPERTENSION  - BP today 167/74  - denies any headache, blurry vision, chest pain, palpitation, dizziness, lightheadedness, or syncopal episodes  - encouraged daily exercise with healthy diet for BP and MADELEINE management  - Defer management to PCP    DEPRESSION  / ANXIETY   + VESTA screen, NEGATIVE SCREEN PHQ-2  - denies any SI, HI or hallucinations   - never been on medications for anxiety or depression  - defer management to PCP     SMOKING STATUS screen  - former smoker        All of patient's questions were answered. He verbalizes understanding and agreement with my assessment and plan.

## 2024-01-17 PROBLEM — F19.21 HISTORY OF SUBSTANCE DEPENDENCE (MULTI): Status: RESOLVED | Noted: 2023-04-13 | Resolved: 2024-01-17

## 2024-01-17 PROBLEM — M25.50 ARTHRALGIA: Status: RESOLVED | Noted: 2024-01-17 | Resolved: 2024-01-17

## 2024-01-17 PROBLEM — K63.5 POLYP OF COLON: Status: RESOLVED | Noted: 2023-04-13 | Resolved: 2024-01-17

## 2024-01-17 PROBLEM — Z86.0100 HISTORY OF COLONIC POLYPS: Status: RESOLVED | Noted: 2023-04-13 | Resolved: 2024-01-17

## 2024-01-17 PROBLEM — M50.321 DEGENERATION OF INTERVERTEBRAL DISC AT C4-C5 LEVEL: Status: RESOLVED | Noted: 2023-09-27 | Resolved: 2024-01-17

## 2024-01-17 PROBLEM — R06.09 DYSPNEA ON EXERTION: Status: RESOLVED | Noted: 2024-01-17 | Resolved: 2024-01-17

## 2024-01-17 PROBLEM — Z86.010 HISTORY OF COLONIC POLYPS: Status: RESOLVED | Noted: 2023-04-13 | Resolved: 2024-01-17

## 2024-01-17 PROBLEM — L08.9 INFECTION OF SKIN: Status: RESOLVED | Noted: 2024-01-17 | Resolved: 2024-01-17

## 2024-01-17 PROBLEM — E66.3 OVERWEIGHT: Status: RESOLVED | Noted: 2024-01-17 | Resolved: 2024-01-17

## 2024-01-17 PROBLEM — S61.219A LACERATION OF FINGER: Status: RESOLVED | Noted: 2024-01-17 | Resolved: 2024-01-17

## 2024-01-17 PROBLEM — K57.30 DIVERTICULOSIS OF LARGE INTESTINE: Status: RESOLVED | Noted: 2023-04-13 | Resolved: 2024-01-17

## 2024-01-17 ASSESSMENT — ENCOUNTER SYMPTOMS
COUGH: 0
MYALGIAS: 0
FREQUENCY: 0
WEAKNESS: 0
DIZZINESS: 0
PALPITATIONS: 0
DECREASED CONCENTRATION: 0
APNEA: 1
BACK PAIN: 0
ABDOMINAL PAIN: 0
HALLUCINATIONS: 0
NERVOUS/ANXIOUS: 0
LIGHT-HEADEDNESS: 0
BRUISES/BLEEDS EASILY: 0
CONFUSION: 0
CHEST TIGHTNESS: 0
SLEEP DISTURBANCE: 0
SHORTNESS OF BREATH: 0
SEIZURES: 0
WHEEZING: 0
ARTHRALGIAS: 0
FATIGUE: 0
TREMORS: 0
HEADACHES: 0

## 2024-10-18 ENCOUNTER — APPOINTMENT (OUTPATIENT)
Dept: RADIOLOGY | Facility: HOSPITAL | Age: 59
End: 2024-10-18
Payer: COMMERCIAL

## 2024-10-21 ENCOUNTER — APPOINTMENT (OUTPATIENT)
Dept: PRIMARY CARE | Facility: CLINIC | Age: 59
End: 2024-10-21
Payer: COMMERCIAL

## 2024-10-21 VITALS
SYSTOLIC BLOOD PRESSURE: 140 MMHG | OXYGEN SATURATION: 99 % | TEMPERATURE: 97.8 F | BODY MASS INDEX: 27.78 KG/M2 | WEIGHT: 216.5 LBS | HEIGHT: 74 IN | HEART RATE: 70 BPM | DIASTOLIC BLOOD PRESSURE: 80 MMHG

## 2024-10-21 DIAGNOSIS — I10 PRIMARY HYPERTENSION: ICD-10-CM

## 2024-10-21 DIAGNOSIS — Z00.00 HEALTHCARE MAINTENANCE: Primary | ICD-10-CM

## 2024-10-21 DIAGNOSIS — E11.9 TYPE 2 DIABETES MELLITUS WITHOUT COMPLICATION, WITHOUT LONG-TERM CURRENT USE OF INSULIN (MULTI): ICD-10-CM

## 2024-10-21 DIAGNOSIS — L85.9 HYPERKERATOSIS OF SKIN: ICD-10-CM

## 2024-10-21 DIAGNOSIS — Z12.5 PROSTATE CANCER SCREENING: ICD-10-CM

## 2024-10-21 LAB
POC ALBUMIN /CREATININE RATIO MANUALLY ENTERED: <30 UG/MG CREAT
POC APPEARANCE, URINE: CLEAR
POC BILIRUBIN, URINE: NEGATIVE
POC BLOOD, URINE: NEGATIVE
POC COLOR, URINE: ABNORMAL
POC GLUCOSE, URINE: NEGATIVE MG/DL
POC KETONES, URINE: NEGATIVE MG/DL
POC LEUKOCYTES, URINE: NEGATIVE
POC NITRITE,URINE: NEGATIVE
POC PH, URINE: 5.5 PH
POC PROTEIN, URINE: NEGATIVE MG/DL
POC SPECIFIC GRAVITY, URINE: 1.02
POC URINE ALBUMIN: 30 MG/L
POC URINE CREATININE: 200 MG/DL
POC UROBILINOGEN, URINE: 0.2 EU/DL

## 2024-10-21 PROCEDURE — 4010F ACE/ARB THERAPY RXD/TAKEN: CPT

## 2024-10-21 PROCEDURE — 99396 PREV VISIT EST AGE 40-64: CPT

## 2024-10-21 PROCEDURE — 3077F SYST BP >= 140 MM HG: CPT

## 2024-10-21 PROCEDURE — 82044 UR ALBUMIN SEMIQUANTITATIVE: CPT

## 2024-10-21 PROCEDURE — 3079F DIAST BP 80-89 MM HG: CPT

## 2024-10-21 PROCEDURE — 81003 URINALYSIS AUTO W/O SCOPE: CPT

## 2024-10-21 PROCEDURE — 1036F TOBACCO NON-USER: CPT

## 2024-10-21 PROCEDURE — 3008F BODY MASS INDEX DOCD: CPT

## 2024-10-21 RX ORDER — KETOCONAZOLE 20 MG/G
CREAM TOPICAL 2 TIMES DAILY
Qty: 30 G | Refills: 0 | Status: SHIPPED | OUTPATIENT
Start: 2024-10-21 | End: 2024-11-04

## 2024-10-21 ASSESSMENT — PROMIS GLOBAL HEALTH SCALE
RATE_MENTAL_HEALTH: EXCELLENT
CARRYOUT_SOCIAL_ACTIVITIES: EXCELLENT
EMOTIONAL_PROBLEMS: NEVER
RATE_AVERAGE_FATIGUE: MODERATE
CARRYOUT_PHYSICAL_ACTIVITIES: MOSTLY
RATE_PHYSICAL_HEALTH: GOOD
RATE_SOCIAL_SATISFACTION: VERY GOOD
RATE_AVERAGE_PAIN: 0
RATE_QUALITY_OF_LIFE: VERY GOOD
RATE_GENERAL_HEALTH: GOOD

## 2024-10-21 ASSESSMENT — ENCOUNTER SYMPTOMS
LOSS OF SENSATION IN FEET: 0
DEPRESSION: 0
OCCASIONAL FEELINGS OF UNSTEADINESS: 0

## 2024-10-21 NOTE — PROGRESS NOTES
Subjective   Patient ID: Randell Collier is a 59 y.o. male who presents for Annual Exam.  Subjective  Randell Collier is a 59 y.o. male and is here for a comprehensive physical exam. The patient reports no problems.    Do you take any herbs or supplements that were not prescribed by a doctor? no  Are you taking calcium supplements? no  Are you taking aspirin daily? no      Do you have pain that bothers you in your daily life? no  [unfilled]     Objective  [unfilled]    Assessment/Plan  Healthy male exam.      1. 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 Patient Counseling:  --Nutrition: Stressed importance of moderation in sodium/caffeine intake, saturated fat and cholesterol, caloric balance, sufficient intake of fresh fruits, vegetables, fiber, calcium, iron, and 1 mg of folate supplement per day (for females capable of pregnancy).  --Exercise: Stressed the importance of regular exercise.   --Injury prevention: Discussed safety belts, safety helmets, smoke detector, smoking near bedding or upholstery.   --Dental health: Discussed importance of regular tooth brushing, flossing, and dental visits.  --Immunizations reviewed.  --Discussed benefits of screening colonoscopy. Due in 2028  --After hours service discussed with patient  3. Discussed the patient's BMI with him.  The BMI is in the acceptable range.  4. Follow up as needed for acute illness          Vitals:    10/21/24 1311   BP: 140/80   Pulse: 70   Temp: 36.6 °C (97.8 °F)   SpO2: 99%       Review of Systems    Objective   Physical Exam    Assessment/Plan   Problem List Items Addressed This Visit       DM2 (diabetes mellitus, type 2) (Multi)    Relevant Orders    POCT UA Automated manually resulted (Completed)    POCT Albumin random urine manually resulted (Completed)    Hemoglobin A1C    HTN (hypertension)     Other Visit Diagnoses       Healthcare maintenance    -  Primary    Relevant Orders    CBC and Auto Differential    Comprehensive  Metabolic Panel    Lipid Panel    TSH with reflex to Free T4 if abnormal    Vitamin B12    Vitamin D 25-Hydroxy,Total (for eval of Vitamin D levels)    Prostate cancer screening        Relevant Orders    PSA, total and free    Hyperkeratosis of skin        Relevant Medications    ketoconazole (NIZOral) 2 % cream    Other Relevant Orders    Referral to Dermatology                 Thank you for coming in today, please call my office if you have any concerns or questions.     Mariusz TORRES, CNP

## 2024-10-21 NOTE — PATIENT INSTRUCTIONS
Flu Vax at health dept  COVID Booster    Shingles Vaccine consider  Pneumonia vaccine as well    UA and albumin  Labs at freestanding lab.  PSA, A1c.    Thank you for coming in today, if any questions or concerns arise, please call my office.   BRIAN Martins-CNP

## 2024-10-25 ENCOUNTER — LAB (OUTPATIENT)
Dept: LAB | Facility: LAB | Age: 59
End: 2024-10-25
Payer: COMMERCIAL

## 2024-10-25 DIAGNOSIS — Z12.5 PROSTATE CANCER SCREENING: ICD-10-CM

## 2024-10-25 DIAGNOSIS — Z00.00 HEALTHCARE MAINTENANCE: ICD-10-CM

## 2024-10-25 DIAGNOSIS — E11.9 TYPE 2 DIABETES MELLITUS WITHOUT COMPLICATION, WITHOUT LONG-TERM CURRENT USE OF INSULIN (MULTI): ICD-10-CM

## 2024-10-25 LAB
25(OH)D3 SERPL-MCNC: 33 NG/ML (ref 30–100)
ALBUMIN SERPL BCP-MCNC: 4 G/DL (ref 3.4–5)
ALP SERPL-CCNC: 61 U/L (ref 33–136)
ALT SERPL W P-5'-P-CCNC: 29 U/L (ref 10–52)
ANION GAP SERPL CALC-SCNC: 11 MMOL/L (ref 10–20)
AST SERPL W P-5'-P-CCNC: 28 U/L (ref 9–39)
BASOPHILS # BLD AUTO: 0.02 X10*3/UL (ref 0–0.1)
BASOPHILS NFR BLD AUTO: 0.2 %
BILIRUB SERPL-MCNC: 0.9 MG/DL (ref 0–1.2)
BUN SERPL-MCNC: 10 MG/DL (ref 6–23)
CALCIUM SERPL-MCNC: 8.8 MG/DL (ref 8.6–10.3)
CHLORIDE SERPL-SCNC: 104 MMOL/L (ref 98–107)
CHOLEST SERPL-MCNC: 133 MG/DL (ref 0–199)
CHOLESTEROL/HDL RATIO: 3.9
CO2 SERPL-SCNC: 27 MMOL/L (ref 21–32)
CREAT SERPL-MCNC: 0.8 MG/DL (ref 0.5–1.3)
EGFRCR SERPLBLD CKD-EPI 2021: >90 ML/MIN/1.73M*2
EOSINOPHIL # BLD AUTO: 0.05 X10*3/UL (ref 0–0.7)
EOSINOPHIL NFR BLD AUTO: 0.5 %
ERYTHROCYTE [DISTWIDTH] IN BLOOD BY AUTOMATED COUNT: 13.1 % (ref 11.5–14.5)
EST. AVERAGE GLUCOSE BLD GHB EST-MCNC: 100 MG/DL
GLUCOSE SERPL-MCNC: 112 MG/DL (ref 74–99)
HBA1C MFR BLD: 5.1 %
HCT VFR BLD AUTO: 43.6 % (ref 41–52)
HDLC SERPL-MCNC: 34 MG/DL
HGB BLD-MCNC: 14.7 G/DL (ref 13.5–17.5)
IMM GRANULOCYTES # BLD AUTO: 0.04 X10*3/UL (ref 0–0.7)
IMM GRANULOCYTES NFR BLD AUTO: 0.4 % (ref 0–0.9)
LDLC SERPL CALC-MCNC: 84 MG/DL
LYMPHOCYTES # BLD AUTO: 1.97 X10*3/UL (ref 1.2–4.8)
LYMPHOCYTES NFR BLD AUTO: 21.1 %
MCH RBC QN AUTO: 32.4 PG (ref 26–34)
MCHC RBC AUTO-ENTMCNC: 33.7 G/DL (ref 32–36)
MCV RBC AUTO: 96 FL (ref 80–100)
MONOCYTES # BLD AUTO: 0.6 X10*3/UL (ref 0.1–1)
MONOCYTES NFR BLD AUTO: 6.4 %
NEUTROPHILS # BLD AUTO: 6.65 X10*3/UL (ref 1.2–7.7)
NEUTROPHILS NFR BLD AUTO: 71.4 %
NON HDL CHOLESTEROL: 99 MG/DL (ref 0–149)
NRBC BLD-RTO: 0 /100 WBCS (ref 0–0)
PLATELET # BLD AUTO: 258 X10*3/UL (ref 150–450)
POTASSIUM SERPL-SCNC: 3.8 MMOL/L (ref 3.5–5.3)
PROT SERPL-MCNC: 7.4 G/DL (ref 6.4–8.2)
RBC # BLD AUTO: 4.54 X10*6/UL (ref 4.5–5.9)
SODIUM SERPL-SCNC: 138 MMOL/L (ref 136–145)
TRIGL SERPL-MCNC: 76 MG/DL (ref 0–149)
TSH SERPL-ACNC: 1.41 MIU/L (ref 0.44–3.98)
VIT B12 SERPL-MCNC: 642 PG/ML (ref 211–911)
VLDL: 15 MG/DL (ref 0–40)
WBC # BLD AUTO: 9.3 X10*3/UL (ref 4.4–11.3)

## 2024-10-25 PROCEDURE — 82607 VITAMIN B-12: CPT

## 2024-10-25 PROCEDURE — 82306 VITAMIN D 25 HYDROXY: CPT

## 2024-10-25 PROCEDURE — 83036 HEMOGLOBIN GLYCOSYLATED A1C: CPT

## 2024-10-28 ENCOUNTER — APPOINTMENT (OUTPATIENT)
Dept: PULMONOLOGY | Facility: CLINIC | Age: 59
End: 2024-10-28
Payer: COMMERCIAL

## 2024-10-29 ENCOUNTER — HOSPITAL ENCOUNTER (OUTPATIENT)
Dept: RADIOLOGY | Facility: HOSPITAL | Age: 59
Discharge: HOME | End: 2024-10-29
Payer: COMMERCIAL

## 2024-10-29 DIAGNOSIS — F17.210 CIGARETTE NICOTINE DEPENDENCE WITHOUT COMPLICATION: ICD-10-CM

## 2024-10-29 LAB
PSA FREE MFR SERPL: 40 %
PSA FREE SERPL-MCNC: 0.4 NG/ML
PSA SERPL IA-MCNC: 1 NG/ML (ref 0–4)

## 2024-10-29 PROCEDURE — 71271 CT THORAX LUNG CANCER SCR C-: CPT

## 2024-10-29 PROCEDURE — 71271 CT THORAX LUNG CANCER SCR C-: CPT | Performed by: RADIOLOGY

## 2024-10-30 ENCOUNTER — TELEPHONE (OUTPATIENT)
Dept: PRIMARY CARE | Facility: CLINIC | Age: 59
End: 2024-10-30
Payer: COMMERCIAL

## 2024-11-01 DIAGNOSIS — E11.22 TYPE 2 DIABETES MELLITUS WITH STAGE 1 CHRONIC KIDNEY DISEASE, WITHOUT LONG-TERM CURRENT USE OF INSULIN (MULTI): ICD-10-CM

## 2024-11-01 DIAGNOSIS — I10 PRIMARY HYPERTENSION: ICD-10-CM

## 2024-11-01 DIAGNOSIS — G25.81 RLS (RESTLESS LEGS SYNDROME): ICD-10-CM

## 2024-11-01 DIAGNOSIS — N18.1 TYPE 2 DIABETES MELLITUS WITH STAGE 1 CHRONIC KIDNEY DISEASE, WITHOUT LONG-TERM CURRENT USE OF INSULIN (MULTI): ICD-10-CM

## 2024-11-01 RX ORDER — METFORMIN HYDROCHLORIDE 500 MG/1
500 TABLET ORAL
Qty: 90 TABLET | Refills: 0 | Status: SHIPPED | OUTPATIENT
Start: 2024-11-01

## 2024-11-01 RX ORDER — SITAGLIPTIN 100 MG/1
100 TABLET, FILM COATED ORAL DAILY
Qty: 90 TABLET | Refills: 0 | Status: SHIPPED | OUTPATIENT
Start: 2024-11-01

## 2024-11-01 RX ORDER — ROPINIROLE 1 MG/1
1 TABLET, FILM COATED ORAL 3 TIMES DAILY
Qty: 270 TABLET | Refills: 0 | Status: SHIPPED | OUTPATIENT
Start: 2024-11-01

## 2024-11-01 RX ORDER — LISINOPRIL 5 MG/1
5 TABLET ORAL DAILY
Qty: 90 TABLET | Refills: 0 | Status: SHIPPED | OUTPATIENT
Start: 2024-11-01

## 2024-11-16 DIAGNOSIS — E55.9 VITAMIN D DEFICIENCY: Primary | ICD-10-CM

## 2024-11-19 RX ORDER — VIT C/E/ZN/COPPR/LUTEIN/ZEAXAN 250MG-90MG
25 CAPSULE ORAL DAILY
Qty: 90 CAPSULE | Refills: 0 | Status: SHIPPED | OUTPATIENT
Start: 2024-11-19

## 2024-12-04 ENCOUNTER — APPOINTMENT (OUTPATIENT)
Dept: PULMONOLOGY | Facility: CLINIC | Age: 59
End: 2024-12-04
Payer: COMMERCIAL

## 2025-01-06 ENCOUNTER — APPOINTMENT (OUTPATIENT)
Dept: SLEEP MEDICINE | Facility: CLINIC | Age: 60
End: 2025-01-06
Payer: COMMERCIAL

## 2025-02-03 DIAGNOSIS — N18.1 TYPE 2 DIABETES MELLITUS WITH STAGE 1 CHRONIC KIDNEY DISEASE, WITHOUT LONG-TERM CURRENT USE OF INSULIN (MULTI): ICD-10-CM

## 2025-02-03 DIAGNOSIS — E11.22 TYPE 2 DIABETES MELLITUS WITH STAGE 1 CHRONIC KIDNEY DISEASE, WITHOUT LONG-TERM CURRENT USE OF INSULIN (MULTI): ICD-10-CM

## 2025-02-03 DIAGNOSIS — G25.81 RLS (RESTLESS LEGS SYNDROME): ICD-10-CM

## 2025-02-03 DIAGNOSIS — I10 PRIMARY HYPERTENSION: ICD-10-CM

## 2025-02-03 RX ORDER — LISINOPRIL 5 MG/1
5 TABLET ORAL DAILY
Qty: 90 TABLET | Refills: 0 | Status: SHIPPED | OUTPATIENT
Start: 2025-02-03

## 2025-02-03 RX ORDER — SITAGLIPTIN 100 MG/1
100 TABLET, FILM COATED ORAL DAILY
Qty: 90 TABLET | Refills: 0 | Status: SHIPPED | OUTPATIENT
Start: 2025-02-03

## 2025-02-03 RX ORDER — METFORMIN HYDROCHLORIDE 500 MG/1
TABLET ORAL
Qty: 90 TABLET | Refills: 0 | Status: SHIPPED | OUTPATIENT
Start: 2025-02-03

## 2025-02-03 RX ORDER — ROPINIROLE 1 MG/1
1 TABLET, FILM COATED ORAL 3 TIMES DAILY
Qty: 270 TABLET | Refills: 0 | Status: SHIPPED | OUTPATIENT
Start: 2025-02-03

## 2025-03-28 DIAGNOSIS — E55.9 VITAMIN D DEFICIENCY: ICD-10-CM

## 2025-03-28 RX ORDER — VIT C/E/ZN/COPPR/LUTEIN/ZEAXAN 250MG-90MG
25 CAPSULE ORAL DAILY
Qty: 90 CAPSULE | Refills: 0 | Status: SHIPPED | OUTPATIENT
Start: 2025-03-28

## 2025-05-08 DIAGNOSIS — N18.1 TYPE 2 DIABETES MELLITUS WITH STAGE 1 CHRONIC KIDNEY DISEASE, WITHOUT LONG-TERM CURRENT USE OF INSULIN (MULTI): ICD-10-CM

## 2025-05-08 DIAGNOSIS — E11.22 TYPE 2 DIABETES MELLITUS WITH STAGE 1 CHRONIC KIDNEY DISEASE, WITHOUT LONG-TERM CURRENT USE OF INSULIN (MULTI): ICD-10-CM

## 2025-05-08 DIAGNOSIS — I10 PRIMARY HYPERTENSION: ICD-10-CM

## 2025-05-08 RX ORDER — METFORMIN HYDROCHLORIDE 500 MG/1
TABLET ORAL
Qty: 90 TABLET | Refills: 0 | Status: SHIPPED | OUTPATIENT
Start: 2025-05-08

## 2025-05-08 RX ORDER — LISINOPRIL 5 MG/1
5 TABLET ORAL DAILY
Qty: 90 TABLET | Refills: 0 | Status: SHIPPED | OUTPATIENT
Start: 2025-05-08

## 2025-05-13 DIAGNOSIS — G25.81 RLS (RESTLESS LEGS SYNDROME): ICD-10-CM

## 2025-05-13 DIAGNOSIS — N18.1 TYPE 2 DIABETES MELLITUS WITH STAGE 1 CHRONIC KIDNEY DISEASE, WITHOUT LONG-TERM CURRENT USE OF INSULIN (MULTI): ICD-10-CM

## 2025-05-13 DIAGNOSIS — E11.22 TYPE 2 DIABETES MELLITUS WITH STAGE 1 CHRONIC KIDNEY DISEASE, WITHOUT LONG-TERM CURRENT USE OF INSULIN (MULTI): ICD-10-CM

## 2025-05-13 RX ORDER — ROPINIROLE 1 MG/1
1 TABLET, FILM COATED ORAL 3 TIMES DAILY
Qty: 270 TABLET | Refills: 0 | Status: SHIPPED | OUTPATIENT
Start: 2025-05-13

## 2025-05-13 RX ORDER — SITAGLIPTIN 100 MG/1
100 TABLET, FILM COATED ORAL DAILY
Qty: 90 TABLET | Refills: 0 | Status: SHIPPED | OUTPATIENT
Start: 2025-05-13

## 2025-08-01 ENCOUNTER — OFFICE VISIT (OUTPATIENT)
Dept: PRIMARY CARE | Facility: CLINIC | Age: 60
End: 2025-08-01
Payer: COMMERCIAL

## 2025-08-01 VITALS
BODY MASS INDEX: 25.47 KG/M2 | TEMPERATURE: 96.9 F | WEIGHT: 195.7 LBS | OXYGEN SATURATION: 98 % | DIASTOLIC BLOOD PRESSURE: 88 MMHG | HEART RATE: 72 BPM | SYSTOLIC BLOOD PRESSURE: 150 MMHG

## 2025-08-01 DIAGNOSIS — R91.1 INCIDENTAL LUNG NODULE: Primary | ICD-10-CM

## 2025-08-01 DIAGNOSIS — R63.4 UNINTENTIONAL WEIGHT LOSS: ICD-10-CM

## 2025-08-01 LAB
POC APPEARANCE, URINE: CLEAR
POC BILIRUBIN, URINE: NEGATIVE
POC BLOOD, URINE: NEGATIVE
POC COLOR, URINE: YELLOW
POC GLUCOSE, URINE: NEGATIVE MG/DL
POC KETONES, URINE: NEGATIVE MG/DL
POC LEUKOCYTES, URINE: NEGATIVE
POC NITRITE,URINE: NEGATIVE
POC PH, URINE: 5.5 PH
POC PROTEIN, URINE: NEGATIVE MG/DL
POC SPECIFIC GRAVITY, URINE: 1.02
POC UROBILINOGEN, URINE: 0.2 EU/DL

## 2025-08-01 PROCEDURE — 3077F SYST BP >= 140 MM HG: CPT

## 2025-08-01 PROCEDURE — 4010F ACE/ARB THERAPY RXD/TAKEN: CPT

## 2025-08-01 PROCEDURE — 3079F DIAST BP 80-89 MM HG: CPT

## 2025-08-01 PROCEDURE — 81003 URINALYSIS AUTO W/O SCOPE: CPT

## 2025-08-01 PROCEDURE — 99213 OFFICE O/P EST LOW 20 MIN: CPT

## 2025-08-01 ASSESSMENT — PATIENT HEALTH QUESTIONNAIRE - PHQ9
1. LITTLE INTEREST OR PLEASURE IN DOING THINGS: NOT AT ALL
SUM OF ALL RESPONSES TO PHQ9 QUESTIONS 1 AND 2: 0
2. FEELING DOWN, DEPRESSED OR HOPELESS: NOT AT ALL

## 2025-08-01 NOTE — PROGRESS NOTES
Subjective   Patient ID: Randell Collier is a 60 y.o. male who presents for Weight Loss (Pt is concerned about weight loss//Also wants to get a spot on his left leg check) and Memory Loss (Also pt is concerned about been really forgetful lately).  HPI    Randell presents for concern of unintentional weight loss and forgetfulness  He has history of weight gain in the winter, loss in the summer  Up to 230 in winter, can be down to 205-210 in the summertime    Pertinent history includes Recent denture dental work done which This has made  eating foods very difficult.    He also is having stressful home life with his family moving soon, preparing house for sale.  Also owns a business.    Drinking soda in excess, up to 12 coca melina per day  Check A1c    History of lung nodule  Check CT lungs    Baseline labs given weight loss as well.      Vitals:    08/01/25 1127   BP: 150/88   Pulse: 72   Temp: 36.1 °C (96.9 °F)   SpO2: 98%       Review of Systems    Objective   Physical Exam    Assessment/Plan   Problem List Items Addressed This Visit    None  Visit Diagnoses         Incidental lung nodule    -  Primary    Relevant Orders    CT lung screening follow up CT chest wo IV contrast      Unintentional weight loss        Relevant Orders    CBC and Auto Differential    Comprehensive Metabolic Panel    Hemoglobin A1C    TSH with reflex to Free T4 if abnormal    POCT UA Automated manually resulted                 Thank you for coming in today, please call my office if you have any concerns or questions.     Mariusz TORRES, CNP

## 2025-08-03 LAB
ALBUMIN SERPL-MCNC: 4.4 G/DL (ref 3.6–5.1)
ALP SERPL-CCNC: 66 U/L (ref 35–144)
ALT SERPL-CCNC: 20 U/L (ref 9–46)
ANION GAP SERPL CALCULATED.4IONS-SCNC: 9 MMOL/L (CALC) (ref 7–17)
AST SERPL-CCNC: 24 U/L (ref 10–35)
BASOPHILS # BLD AUTO: 37 CELLS/UL (ref 0–200)
BASOPHILS NFR BLD AUTO: 0.4 %
BILIRUB SERPL-MCNC: 0.6 MG/DL (ref 0.2–1.2)
BUN SERPL-MCNC: 10 MG/DL (ref 7–25)
CALCIUM SERPL-MCNC: 9.2 MG/DL (ref 8.6–10.3)
CHLORIDE SERPL-SCNC: 102 MMOL/L (ref 98–110)
CO2 SERPL-SCNC: 28 MMOL/L (ref 20–32)
CREAT SERPL-MCNC: 0.81 MG/DL (ref 0.7–1.35)
EGFRCR SERPLBLD CKD-EPI 2021: 100 ML/MIN/1.73M2
EOSINOPHIL # BLD AUTO: 83 CELLS/UL (ref 15–500)
EOSINOPHIL NFR BLD AUTO: 0.9 %
ERYTHROCYTE [DISTWIDTH] IN BLOOD BY AUTOMATED COUNT: 12.5 % (ref 11–15)
EST. AVERAGE GLUCOSE BLD GHB EST-MCNC: 108 MG/DL
EST. AVERAGE GLUCOSE BLD GHB EST-SCNC: 6 MMOL/L
GLUCOSE SERPL-MCNC: 99 MG/DL (ref 65–99)
HBA1C MFR BLD: 5.4 %
HCT VFR BLD AUTO: 43.6 % (ref 38.5–50)
HGB BLD-MCNC: 14.4 G/DL (ref 13.2–17.1)
LYMPHOCYTES # BLD AUTO: 2475 CELLS/UL (ref 850–3900)
LYMPHOCYTES NFR BLD AUTO: 26.9 %
MCH RBC QN AUTO: 32.9 PG (ref 27–33)
MCHC RBC AUTO-ENTMCNC: 33 G/DL (ref 32–36)
MCV RBC AUTO: 99.5 FL (ref 80–100)
MONOCYTES # BLD AUTO: 598 CELLS/UL (ref 200–950)
MONOCYTES NFR BLD AUTO: 6.5 %
NEUTROPHILS # BLD AUTO: 6008 CELLS/UL (ref 1500–7800)
NEUTROPHILS NFR BLD AUTO: 65.3 %
PLATELET # BLD AUTO: 288 THOUSAND/UL (ref 140–400)
PMV BLD REES-ECKER: 10.6 FL (ref 7.5–12.5)
POTASSIUM SERPL-SCNC: 4.3 MMOL/L (ref 3.5–5.3)
PROT SERPL-MCNC: 7.4 G/DL (ref 6.1–8.1)
RBC # BLD AUTO: 4.38 MILLION/UL (ref 4.2–5.8)
SODIUM SERPL-SCNC: 139 MMOL/L (ref 135–146)
TSH SERPL-ACNC: 1.74 MIU/L (ref 0.4–4.5)
WBC # BLD AUTO: 9.2 THOUSAND/UL (ref 3.8–10.8)

## 2025-08-04 ENCOUNTER — RESULTS FOLLOW-UP (OUTPATIENT)
Dept: PRIMARY CARE | Facility: CLINIC | Age: 60
End: 2025-08-04
Payer: COMMERCIAL

## 2025-08-04 NOTE — TELEPHONE ENCOUNTER
----- Message from Mariusz Lazar sent at 8/4/2025 11:00 AM EDT -----  Results are normal, please notify the patient. Good news, blood work is all normal. Get the CT lung screening done and we will go from there. Likely weight loss is related to dentures and stress  ----- Message -----  From: Jennifer Head MA  Sent: 8/1/2025   1:07 PM EDT  To: BRIAN Martins-CNP

## 2025-08-04 NOTE — RESULT ENCOUNTER NOTE
Results are normal, please notify the patient. Good news, blood work is all normal. Get the CT lung screening done and we will go from there. Likely weight loss is related to dentures and stress